# Patient Record
Sex: MALE | Race: WHITE | NOT HISPANIC OR LATINO | Employment: FULL TIME | URBAN - METROPOLITAN AREA
[De-identification: names, ages, dates, MRNs, and addresses within clinical notes are randomized per-mention and may not be internally consistent; named-entity substitution may affect disease eponyms.]

---

## 2017-05-19 PROBLEM — Z98.890 H/O COLONOSCOPY: Status: ACTIVE | Noted: 2017-05-19

## 2022-07-26 ENCOUNTER — OFFICE VISIT (OUTPATIENT)
Dept: UROLOGY | Facility: CLINIC | Age: 67
End: 2022-07-26
Payer: MEDICARE

## 2022-07-26 DIAGNOSIS — Z85.46 HISTORY OF PROSTATE CANCER: ICD-10-CM

## 2022-07-26 DIAGNOSIS — N20.1 URETERAL STONE: Primary | ICD-10-CM

## 2022-07-26 DIAGNOSIS — N20.0 KIDNEY STONES: ICD-10-CM

## 2022-07-26 PROCEDURE — 99999 PR PBB SHADOW E&M-EST. PATIENT-LVL II: CPT | Mod: PBBFAC,,,

## 2022-07-26 PROCEDURE — 99212 OFFICE O/P EST SF 10 MIN: CPT | Mod: PBBFAC,PO

## 2022-07-26 PROCEDURE — 99203 OFFICE O/P NEW LOW 30 MIN: CPT | Mod: S$PBB,,,

## 2022-07-26 PROCEDURE — 99203 PR OFFICE/OUTPT VISIT, NEW, LEVL III, 30-44 MIN: ICD-10-PCS | Mod: S$PBB,,,

## 2022-07-26 PROCEDURE — 99999 PR PBB SHADOW E&M-EST. PATIENT-LVL II: ICD-10-PCS | Mod: PBBFAC,,,

## 2022-07-26 RX ORDER — TAMSULOSIN HYDROCHLORIDE 0.4 MG/1
0.4 CAPSULE ORAL DAILY
Qty: 14 CAPSULE | Refills: 0 | Status: SHIPPED | OUTPATIENT
Start: 2022-07-26 | End: 2022-09-28 | Stop reason: ALTCHOICE

## 2022-07-26 RX ORDER — OXYCODONE AND ACETAMINOPHEN 5; 325 MG/1; MG/1
TABLET ORAL
COMMUNITY
Start: 2022-07-26 | End: 2022-09-28 | Stop reason: ALTCHOICE

## 2022-07-26 RX ORDER — AZITHROMYCIN 250 MG/1
TABLET, FILM COATED ORAL
COMMUNITY
Start: 2022-06-19 | End: 2022-09-28

## 2022-07-26 RX ORDER — ONDANSETRON 4 MG/1
4 TABLET, ORALLY DISINTEGRATING ORAL EVERY 8 HOURS PRN
Qty: 10 TABLET | Refills: 0 | Status: SHIPPED | OUTPATIENT
Start: 2022-07-26 | End: 2022-08-02

## 2022-07-26 NOTE — PROGRESS NOTES
[facility] Note  Staff: DOMINICK Soria    PCP: MD Oliver    Chief Complaint: Kidney Stones    Subjective:        HPI: Joe Lazaro is a 67 y.o. male NEW PATIENT presents today for evaluation of kidney stones. He is accompanied by his wife whom is helpful with his medical history and interview. He states he went to [place] last night and CT found a 5mm left ureteral stone. He does not have the disc or report and access is not in [facility]. He does have a history of kidney stones, last one about 1-2 years ago. He also has a history of prostate cancer and underwent a prostatectomy and one hormone deprivation injection from Dr. Mann. He is currently taking percocet 5mg which he states is alleviating the pain. He was not given anything for nausea or tamsulosin. He denies any new or worsening urinary complaints such as dysuria, hematuria, urgency, fever, and feeling of incomplete bladder emptying. He is currently not taking any bladder or prostate medications.     Questions asked the pt during ov today:  Urgency: No, urge incontinence? No  NTF: 0-1x night  Dysuria: No  Gross Hematuria:No  Straining:No, Hesistancy:No, Intermittency:No}, Weak stream:No    Last PSA Screening:   Lab Results   Component Value Date    PSADIAG <0.06 11/19/2021    PSADIAG <0.07 10/15/2019    PSADIAG <0.07 08/31/2018       History of Kidney Stones?:  Yes    Constipation issues?:  No    REVIEW OF SYSTEMS:  Review of Systems   Constitutional: Negative.  Negative for chills and fever.   HENT: Negative.    Eyes: Negative.    Respiratory: Negative.    Cardiovascular: Negative.    Gastrointestinal: Positive for abdominal pain and nausea. Negative for vomiting.   Genitourinary: Positive for flank pain (left). Negative for dysuria, frequency, hematuria and urgency.   Musculoskeletal: Positive for back pain.   Skin: Negative.    Neurological: Negative.    Endo/Heme/Allergies: Negative.    Psychiatric/Behavioral: Negative.         PMHx:  Past Medical History:   Diagnosis Date    Hyperlipidemia     Hypertension     Prostate cancer        PSHx:  Past Surgical History:   Procedure Laterality Date    APPENDECTOMY      COLONOSCOPY  08/2017    PROSTATE SURGERY      2005       Fam Hx:   malignancies: No    kidney stones: No     Soc Hx:  , lives in [place]    Allergies:  Patient has no known allergies.    Medications: reviewed     Objective:   There were no vitals filed for this visit.    Physical Exam  Constitutional:       Appearance: Normal appearance.   HENT:      Head: Normocephalic.   Eyes:      Conjunctiva/sclera: Conjunctivae normal.   Pulmonary:      Effort: Pulmonary effort is normal.   Abdominal:      General: There is no distension.      Palpations: Abdomen is soft.      Tenderness: There is no abdominal tenderness. There is left CVA tenderness. There is no right CVA tenderness.   Musculoskeletal:         General: Normal range of motion.      Cervical back: Normal range of motion.   Skin:     General: Skin is warm.   Neurological:      Mental Status: He is alert and oriented to person, place, and time.   Psychiatric:         Mood and Affect: Mood normal.         Behavior: Behavior normal.           LABS REVIEW:  UA today:  Color:Clear, Yellow  Spec. Grav.  1.010  PH  6.0  Negative for leukocytes, nitrates, glucose, ketones, urobili, bili  Large blood  Positive protein    Assessment:       1. Ureteral stone    2. Kidney stones    3. History of prostate cancer          Plan:     1. Will give patient a 7-10 day trial to pass stone  2. zofran and tamsulosin prescribed, strainer given to pt  Recommendations:   Increase hydration 2 liters fluid per day.      Strain Urine     Take pain medications as needed     Call office for severe pain or fever >101  4.  Things you can do to decrease your risk of recurring stones:  1. Increase fluid intake - You should be producing at least 2.5 L of urine per 24 hour period. If your  urine is dark you need to be drinking more water.  2. Lower sodium intake - this helps lower the amount of calcium being lost in your urine. An ideal intake is between 2300 and 3300mg of sodium daily.  3. Normal calcium diet - ideal intake is between 800 and 1200mg of calcium daily. You do not want to decrease the amount of calcium you take in because this can actually increase your risk of making stone.     Limit iced tea and lynda,  avoid Tums and Rolaids, to avoid Vitamin C supplementation and to limit salt and red meat intake.      F/u Annually or As Needed    [facility]: Active    Stephanie Escobedo, LATONIA-C

## 2022-07-28 ENCOUNTER — TELEPHONE (OUTPATIENT)
Dept: UROLOGY | Facility: CLINIC | Age: 67
End: 2022-07-28
Payer: MEDICARE

## 2022-07-28 NOTE — TELEPHONE ENCOUNTER
----- Message from Zaid Noland sent at 7/28/2022  8:33 AM CDT -----  Type: Needs Medical Advice  Who Called:  pt wife, Kasandra  Symptoms (please be specific):  pt have a fever of 101 and was given pain meds for kidney stones and the meds are not working said he was up all night in pain and need some advise on what to do   Best Call Back Number: [phone] (home) or [phone]      Additional Information: please advise--thank you

## 2022-08-01 ENCOUNTER — TELEPHONE (OUTPATIENT)
Dept: UROLOGY | Facility: CLINIC | Age: 67
End: 2022-08-01
Payer: MEDICARE

## 2022-08-01 DIAGNOSIS — N22 CALCULUS OF URINARY TRACT IN DISEASES CLASSIFIED ELSEWHERE: ICD-10-CM

## 2022-08-01 NOTE — TELEPHONE ENCOUNTER
----- Message from Jaylin Beach sent at 8/1/2022  8:28 AM CDT -----  Contact: Wife Crystal  Type: Needs Medical Advice  Who Called:  Wife Crystal  Symptoms (please be specific):  constant pain, weak stream, blood in urine  How long has patient had these symptoms:  few days  Best Call Back Number: [phone]  Additional Information: Please call pt wife back to advise, does he need to get in or go to ED? Thank You.

## 2022-08-01 NOTE — TELEPHONE ENCOUNTER
----- Message from Jaylin Beach sent at 8/1/2022  8:28 AM CDT -----  Contact: Wife Crystal  Type: Needs Medical Advice  Who Called:  Wife Crystal  Symptoms (please be specific):  constant pain, weak stream, blood in urine  How long has patient had these symptoms:  few days  Best Call Back Number: [phone]  Additional Information: Please call pt wife back to advise, does he need to get in or go to ED? Thank You.

## 2022-08-02 ENCOUNTER — TELEPHONE (OUTPATIENT)
Dept: UROLOGY | Facility: CLINIC | Age: 67
End: 2022-08-02
Payer: MEDICARE

## 2022-08-02 ENCOUNTER — PATIENT MESSAGE (OUTPATIENT)
Dept: UROLOGY | Facility: CLINIC | Age: 67
End: 2022-08-02
Payer: MEDICARE

## 2022-08-02 ENCOUNTER — HOSPITAL ENCOUNTER (OUTPATIENT)
Dept: RADIOLOGY | Facility: HOSPITAL | Age: 67
Discharge: HOME | End: 2022-08-02
Payer: MEDICARE

## 2022-08-02 DIAGNOSIS — N22 CALCULUS OF URINARY TRACT IN DISEASES CLASSIFIED ELSEWHERE: ICD-10-CM

## 2022-08-02 PROCEDURE — 74176 CT ABD & PELVIS W/O CONTRAST: CPT | Mod: 26,,, | Performed by: RADIOLOGY

## 2022-08-02 PROCEDURE — 74176 CT ABD & PELVIS W/O CONTRAST: CPT | Mod: TC,PO

## 2022-08-02 PROCEDURE — 74176 CT RENAL STONE STUDY ABD PELVIS WO: ICD-10-PCS | Mod: 26,,, | Performed by: RADIOLOGY

## 2022-09-28 PROBLEM — Z12.5 SCREENING FOR MALIGNANT NEOPLASM OF PROSTATE: Status: ACTIVE | Noted: 2022-09-28

## 2023-04-04 ENCOUNTER — PATIENT MESSAGE (OUTPATIENT)
Dept: FAMILY MEDICINE | Facility: CLINIC | Age: 68
End: 2023-04-04
Payer: MEDICARE

## 2023-08-29 ENCOUNTER — OFFICE VISIT (OUTPATIENT)
Dept: UROLOGY | Facility: CLINIC | Age: 68
End: 2023-08-29
Payer: MEDICARE

## 2023-08-29 VITALS — BODY MASS INDEX: 27.53 KG/M2 | HEIGHT: 66 IN | WEIGHT: 171.31 LBS

## 2023-08-29 DIAGNOSIS — Z85.46 HISTORY OF PROSTATE CANCER: Primary | ICD-10-CM

## 2023-08-29 DIAGNOSIS — N20.0 KIDNEY STONES: ICD-10-CM

## 2023-08-29 DIAGNOSIS — N52.31 ERECTILE DYSFUNCTION FOLLOWING RADICAL PROSTATECTOMY: ICD-10-CM

## 2023-08-29 LAB
BILIRUBIN, UA POC OHS: NEGATIVE
BLOOD, UA POC OHS: NEGATIVE
CLARITY, UA POC OHS: CLEAR
COLOR, UA POC OHS: YELLOW
GLUCOSE, UA POC OHS: NEGATIVE
KETONES, UA POC OHS: ABNORMAL
LEUKOCYTES, UA POC OHS: NEGATIVE
NITRITE, UA POC OHS: NEGATIVE
PH, UA POC OHS: 6
POC RESIDUAL URINE VOLUME: 0 ML (ref 0–100)
PROTEIN, UA POC OHS: NEGATIVE
SPECIFIC GRAVITY, UA POC OHS: 1.02
UROBILINOGEN, UA POC OHS: 1

## 2023-08-29 PROCEDURE — 99999 PR PBB SHADOW E&M-EST. PATIENT-LVL III: ICD-10-PCS | Mod: PBBFAC,,, | Performed by: STUDENT IN AN ORGANIZED HEALTH CARE EDUCATION/TRAINING PROGRAM

## 2023-08-29 PROCEDURE — 99999PBSHW POCT URINALYSIS(INSTRUMENT): ICD-10-PCS | Mod: PBBFAC,,,

## 2023-08-29 PROCEDURE — 99999PBSHW POCT URINALYSIS(INSTRUMENT): Mod: PBBFAC,,,

## 2023-08-29 PROCEDURE — 81003 URINALYSIS AUTO W/O SCOPE: CPT | Mod: PBBFAC,PO | Performed by: STUDENT IN AN ORGANIZED HEALTH CARE EDUCATION/TRAINING PROGRAM

## 2023-08-29 PROCEDURE — 99999PBSHW POCT BLADDER SCAN: Mod: PBBFAC,,,

## 2023-08-29 PROCEDURE — 51798 US URINE CAPACITY MEASURE: CPT | Mod: PBBFAC,PO | Performed by: STUDENT IN AN ORGANIZED HEALTH CARE EDUCATION/TRAINING PROGRAM

## 2023-08-29 PROCEDURE — 99999 PR PBB SHADOW E&M-EST. PATIENT-LVL III: CPT | Mod: PBBFAC,,, | Performed by: STUDENT IN AN ORGANIZED HEALTH CARE EDUCATION/TRAINING PROGRAM

## 2023-08-29 PROCEDURE — 99213 OFFICE O/P EST LOW 20 MIN: CPT | Mod: PBBFAC,PO | Performed by: STUDENT IN AN ORGANIZED HEALTH CARE EDUCATION/TRAINING PROGRAM

## 2023-08-29 PROCEDURE — 99213 OFFICE O/P EST LOW 20 MIN: CPT | Mod: S$PBB,,, | Performed by: STUDENT IN AN ORGANIZED HEALTH CARE EDUCATION/TRAINING PROGRAM

## 2023-08-29 PROCEDURE — 99213 PR OFFICE/OUTPT VISIT, EST, LEVL III, 20-29 MIN: ICD-10-PCS | Mod: S$PBB,,, | Performed by: STUDENT IN AN ORGANIZED HEALTH CARE EDUCATION/TRAINING PROGRAM

## 2023-08-29 NOTE — PROGRESS NOTES
"[facility]   Clinic Note    Subjective:     Chief Complaint: Annual Exam      History of Present Illness:  Joe Lazaro is a 68 y.o. male who presents to clinic for evaluation and management of multiple urologic issues. He is established to our clinic    He has a history of a prostatectomy with Dr. Mann in 2005. PSAs have been undetectable.  He has no urinary symptoms specifically no urinary incontinence.  He has had erectile dysfunction and has had side effects from Viagra and Cialis.  Intracavernosal injections were not effective.  Uses a vacuum pump with good results.    He also has a history of kidney stones.  Most recently passed a stone 1 year ago and was seen in our clinic.    Past medical, family, surgical and social history reviewed as documented in chart with pertinent positive medical, family, surgical and social history detailed in HPI.    A review systems was conducted with pertinent positive and negative findings documented in HPI.    Objective:     Estimated body mass index is 27.65 kg/m² as calculated from the following:    Height as of this encounter: 5' 6" (1.676 m).    Weight as of this encounter: 77.7 kg (171 lb 4.8 oz).    Vital Signs (Most Recent)       Physical Exam  Constitutional:       General: He is not in acute distress.     Appearance: He is not ill-appearing or toxic-appearing.   Pulmonary:      Effort: Pulmonary effort is normal. No accessory muscle usage or respiratory distress.   Neurological:      Mental Status: He is alert.         Labs reviewed below:  Lab Results   Component Value Date    BUN 19 09/28/2022    CREATININE 1.08 09/28/2022    WBC 6.95 11/19/2021    HGB 15.7 11/19/2021    HCT 49.0 11/19/2021     11/19/2021    AST 28 09/28/2022    ALT 29 09/28/2022    ALKPHOS 86 09/28/2022    ALBUMIN 4.6 09/28/2022    HGBA1C 5.6 03/02/2021        PSA trend reviewed below:  Lab Results   Component Value Date    PSADIAG <0.06 09/28/2022    PSADIAG <0.06 11/19/2021    " PSADIAG <0.07 10/15/2019    PSADIAG <0.07 08/31/2018    PSADIAG <0.07 06/07/2017      Assessment:     1. History of prostate cancer    2. Kidney stones    3. Erectile dysfunction following radical prostatectomy      Plan:     His PSA has remained undetectable since 2005.  I explained this is reassuring there is a low risk of recurrence.  However given his age it would be reasonable to continue the PSA surveillance with yearly bloodwork.  Since he is otherwise doing well I would recommend he continue PSA surveillance yearly with Dr. Boyd.  Any elevations or increasing PSA he can see us back here.    Continue current therapy for ED      Med Lui MD     Total time for today's visit was 25 minutes. This includes face to face time and non-face to face time preparing to see the patient (eg, review of tests), obtaining and/or reviewing separately obtained history, documenting clinical information in the electronic or other health record, independently interpreting results and communicating results to the patient/family/caregiver, or care coordinator.